# Patient Record
(demographics unavailable — no encounter records)

---

## 2025-01-06 NOTE — HISTORY OF PRESENT ILLNESS
[FreeTextEntry1] : f/up Type 2 diabetes mellitus   last A1c: 9.4 %   Patient with past medical history as below     Screening LDL: on statin Microalbumin: Cr: on lisinopril  EGFR: 105     Current diabetic medication regimen (verified with patient):   basaglar 18 units Qhs metformin 1000mg po bid Ozempic 0.5mg Q weekly (patient self d/c 2 months ago due to fatigue)   Ambulatory glucose profile (AGP):    Device: Abbott Freestyle Alexandra 3 : did not use.   Reports fell off and did not use again  No FS readings   PCOT: 269mg/dl

## 2025-04-07 NOTE — HISTORY OF PRESENT ILLNESS
[FreeTextEntry1] : f/up Type 2 diabetes mellitus   last A1c:  11.9%   Patient with past medical history as below     Screening LDL: on statin Microalbumin: Cr: on lisinopril  EGFR: 105     Current diabetic medication regimen (verified with patient):   basaglar 24 units Qhs metformin 1000mg po bid Jardiance 10mg po daily   Ozempic : reported intolerance (patient self d/c due to fatigue)